# Patient Record
Sex: MALE | Race: BLACK OR AFRICAN AMERICAN | Employment: FULL TIME | URBAN - METROPOLITAN AREA
[De-identification: names, ages, dates, MRNs, and addresses within clinical notes are randomized per-mention and may not be internally consistent; named-entity substitution may affect disease eponyms.]

---

## 2017-10-17 ENCOUNTER — HOSPITAL ENCOUNTER (EMERGENCY)
Facility: CLINIC | Age: 67
Discharge: HOME | End: 2017-10-17
Attending: EMERGENCY MEDICINE
Payer: MEDICARE

## 2017-10-17 VITALS
SYSTOLIC BLOOD PRESSURE: 141 MMHG | DIASTOLIC BLOOD PRESSURE: 86 MMHG | BODY MASS INDEX: 29.82 KG/M2 | WEIGHT: 225 LBS | HEART RATE: 76 BPM | TEMPERATURE: 98.1 F | HEIGHT: 73 IN | OXYGEN SATURATION: 99 % | RESPIRATION RATE: 16 BRPM

## 2017-10-17 DIAGNOSIS — S81.812A LACERATION OF LEFT LOWER EXTREMITY, INITIAL ENCOUNTER: Primary | ICD-10-CM

## 2017-10-17 PROCEDURE — 99282 EMERGENCY DEPT VISIT SF MDM: CPT

## 2017-10-17 PROCEDURE — 2500000003 HC RX 250 WO HCPCS: Performed by: EMERGENCY MEDICINE

## 2017-10-17 PROCEDURE — 90715 TDAP VACCINE 7 YRS/> IM: CPT | Performed by: EMERGENCY MEDICINE

## 2017-10-17 PROCEDURE — 12004 RPR S/N/AX/GEN/TRK7.6-12.5CM: CPT

## 2017-10-17 PROCEDURE — 90471 IMMUNIZATION ADMIN: CPT | Performed by: EMERGENCY MEDICINE

## 2017-10-17 PROCEDURE — 6360000002 HC RX W HCPCS: Performed by: EMERGENCY MEDICINE

## 2017-10-17 PROCEDURE — 6370000000 HC RX 637 (ALT 250 FOR IP): Performed by: EMERGENCY MEDICINE

## 2017-10-17 RX ORDER — BACITRACIN, NEOMYCIN, POLYMYXIN B 400; 3.5; 5 [USP'U]/G; MG/G; [USP'U]/G
OINTMENT TOPICAL ONCE
Status: COMPLETED | OUTPATIENT
Start: 2017-10-17 | End: 2017-10-17

## 2017-10-17 RX ORDER — LIDOCAINE HYDROCHLORIDE 10 MG/ML
20 INJECTION, SOLUTION INFILTRATION; PERINEURAL ONCE
Status: COMPLETED | OUTPATIENT
Start: 2017-10-17 | End: 2017-10-17

## 2017-10-17 RX ADMIN — LIDOCAINE HYDROCHLORIDE 20 ML: 10 INJECTION, SOLUTION INFILTRATION; PERINEURAL at 10:49

## 2017-10-17 RX ADMIN — TETANUS TOXOID, REDUCED DIPHTHERIA TOXOID AND ACELLULAR PERTUSSIS VACCINE, ADSORBED 0.5 ML: 5; 2.5; 8; 8; 2.5 SUSPENSION INTRAMUSCULAR at 10:49

## 2017-10-17 RX ADMIN — NEOMYCIN AND POLYMYXIN B SULFATES AND BACITRACIN ZINC: 400; 3.5; 5 OINTMENT TOPICAL at 10:50

## 2017-10-17 ASSESSMENT — PAIN DESCRIPTION - PROGRESSION: CLINICAL_PROGRESSION: NOT CHANGED

## 2017-10-17 ASSESSMENT — PAIN SCALES - GENERAL: PAINLEVEL_OUTOF10: 7

## 2017-10-17 ASSESSMENT — PAIN DESCRIPTION - LOCATION: LOCATION: LEG

## 2017-10-17 ASSESSMENT — PAIN DESCRIPTION - PAIN TYPE: TYPE: ACUTE PAIN

## 2017-10-17 ASSESSMENT — PAIN DESCRIPTION - ONSET: ONSET: SUDDEN

## 2017-10-17 ASSESSMENT — PAIN DESCRIPTION - DESCRIPTORS: DESCRIPTORS: ACHING

## 2017-10-17 ASSESSMENT — PAIN DESCRIPTION - ORIENTATION: ORIENTATION: LEFT;UPPER

## 2017-10-17 ASSESSMENT — PAIN DESCRIPTION - FREQUENCY: FREQUENCY: CONTINUOUS

## 2017-10-17 NOTE — ED PROVIDER NOTES
[facility]  [address] [address]  Phone: [phone]        Pt Name: Pastor Rodriguez  MRN: [id number]  [place] 1950  Date of evaluation: 10/17/17      CHIEF COMPLAINT       Chief Complaint   Patient presents with    Laceration     2 laceration to the left upper leg. HISTORY OF PRESENT ILLNESS    Pastor Rodriguez is a 74 y.o. male who presents Of his left leg, it occurred just prior to arrival.  He is self-employed contractor he was using a type of saw with a carbide blade he would put it down it was still running a caught his jeans lacerating and medial aspect of his left thigh just above the knee and also Just above  the knee . He was able toambulate he went to urgent care who called the squad who sent him over here. He denies any other injuries he is not a diabetic. His last tetanus was unknown      REVIEW OF SYSTEMS       All systems reviewed and negative as stated above     PAST MEDICAL HISTORY    has a past medical history of Hypertension. SURGICAL HISTORY      has a past surgical history that includes Rotator cuff repair (Right) and Foot surgery (Right, 2014). CURRENT MEDICATIONS       Previous Medications    AMLODIPINE BESYLATE PO    Take by mouth    LISINOPRIL-HYDROCHLOROTHIAZIDE PO    Take by mouth       ALLERGIES     is allergic to vicodin [hydrocodone-acetaminophen]; diprivan [propofol]; and other. FAMILY HISTORY     has no family status information on file. family history is not on file. SOCIAL HISTORY      reports that he has never smoked. He has never used smokeless tobacco. He reports that he drinks about 1.2 oz of alcohol per week . He reports that he does not use drugs. PHYSICAL EXAM     INITIAL VITALS:  height is 6' 1\" (1.854 m) and weight is 102.1 kg (225 lb). His oral temperature is 98.1 °F (36.7 °C). His blood pressure is 125/74 and his pulse is 64. His respiration is 16 and oxygen saturation is 94%.         Physical Exam   Constitutional: He is (225 lb)   Height: 6' 1\" (1.854 m)     -------------------------  BP: 125/74, Temp: 98.1 °F (36.7 °C), Pulse: 64, Resp: 16          CONSULTS:      PROCEDURES:  Laceration repair  Indications were 6 cm laceration, and a 5 cm laceration to the left medial thigh  Prepped with Betadine the wound was then draped in a sterile fashion and irrigated with sterile saline and anesthetized locally with 1% lidocaine total of 12 mL were used between both lacerations of the lacerations were explored there was no foreign bodies no penetration to the muscular layer the upper laceration was closed with 11 4-0 Ethilon sutures with good wound approximation  The lower laceration was closed with 12 4-0 Ethilon sutures with good approximation patient tolerated the procedure well bacitracin and a dressing was applied as stated above he was updated on his tetanus    FINAL IMPRESSION      1. Laceration of left lower extremity, initial encounter          DISPOSITION/PLAN   Discharge in stable condition    PATIENT REFERRED TO:  Physician of choice            DISCHARGE MEDICATIONS:  New Prescriptions    No medications on file       (Please note that portions of this note were completed with a voice recognition program.  Efforts were made to edit the dictations but occasionally words are mis-transcribed.)    Bird MD, F.A.A.E.M.   Attending Emergency Medicine Physician      Polly Williamson MD  10/17/17 [id number]

## 2017-10-17 NOTE — ED TRIAGE NOTES
Patient picked up the circular blade saw with carbide blade that was on and he did not know it. It feel and hit him in two spots in the left upper leg.

## 2017-10-25 ENCOUNTER — HOSPITAL ENCOUNTER (EMERGENCY)
Facility: CLINIC | Age: 67
Discharge: HOME | End: 2017-10-25
Attending: SPECIALIST
Payer: MEDICARE

## 2017-10-25 VITALS
SYSTOLIC BLOOD PRESSURE: 170 MMHG | HEIGHT: 74 IN | HEART RATE: 57 BPM | RESPIRATION RATE: 16 BRPM | DIASTOLIC BLOOD PRESSURE: 93 MMHG | WEIGHT: 225 LBS | OXYGEN SATURATION: 96 % | BODY MASS INDEX: 28.88 KG/M2 | TEMPERATURE: 97.8 F

## 2017-10-25 DIAGNOSIS — I10 ESSENTIAL HYPERTENSION: ICD-10-CM

## 2017-10-25 DIAGNOSIS — Z51.89 VISIT FOR WOUND CHECK: Primary | ICD-10-CM

## 2017-10-25 PROCEDURE — 99282 EMERGENCY DEPT VISIT SF MDM: CPT

## 2017-10-25 RX ORDER — CEPHALEXIN 500 MG/1
500 CAPSULE ORAL 4 TIMES DAILY
Qty: 28 CAPSULE | Refills: 0 | Status: SHIPPED | OUTPATIENT
Start: 2017-10-25 | End: 2017-11-01

## 2017-10-25 RX ORDER — LISINOPRIL AND HYDROCHLOROTHIAZIDE 20; 12.5 MG/1; MG/1
1 TABLET ORAL DAILY
Qty: 7 TABLET | Refills: 0 | Status: SHIPPED | OUTPATIENT
Start: 2017-10-25

## 2017-10-25 ASSESSMENT — ENCOUNTER SYMPTOMS: COLOR CHANGE: 1

## 2017-10-25 NOTE — ED NOTES
Pt arrives to ER with c/o suture removal.  He states that his sutures are coming out in his left leg. 2 wounds noted to medial left thigh. Pt applied liquid band-aid. Healing noted, redness around both sites, warm to the touch. Pt resting in resting in bed, comfort offered, no concerns no s/s of distress.       Hay Mcdonough RN  10/25/17 4131

## 2017-10-25 NOTE — ED PROVIDER NOTES
display      none obtained      LABS:  Labs Reviewed - No data to display      EMERGENCY DEPARTMENT COURSE:   Vitals:    Vitals:    10/25/17 1554 10/25/17 1632   BP: (!) 192/101 (!) 170/93   Pulse: 57    Resp: 16    Temp: 97.8 °F (36.6 °C)    TempSrc: Temporal    SpO2: 96%    Weight: 225 lb (102.1 kg)    Height: 6' 2\" (1.88 m)      -------------------------  BP: (!) 170/93, Temp: 97.8 °F (36.6 °C), Pulse: 57, Resp: 16    Orders Placed This Encounter   Medications    lisinopril-hydrochlorothiazide (PRINZIDE;ZESTORETIC) 20-12.5 MG per tablet     Sig: Take 1 tablet by mouth daily     Dispense:  7 tablet     Refill:  0    cephALEXin (KEFLEX) 500 MG capsule     Sig: Take 1 capsule by mouth 4 times daily for 7 days     Dispense:  28 capsule     Refill:  0       Plan is to start the patient on Keflex 500 mg 4 times daily for 7 days. Patient has ran out of his antihypertensive medication and given prescription refill for 7 tablets. He has appointment with his PCP next week. He was advised to watch carefully for worsening of the infection at the laceration site, plenty of oral fluids, take Tylenol and/or ibuprofen as needed, follow up with PCP, return if worse. I have reviewed the disposition diagnosis with the patient and or their family/guardian. I have answered their questions and given discharge instructions. They voiced understanding of these instructions and did not have any further questions or complaints. Re-evaluation Notes    Upon reevaluation, patient is resting comfortably and does not appear to be in any pain or distress. PROCEDURES:  None    FINAL IMPRESSION      1. Visit for wound check    2.  Essential hypertension          DISPOSITION/PLAN   DISPOSITION Decision to Discharge    Condition on Disposition    stable    PATIENT REFERRED TO:  Amadou Castro MD  [address] [id number]    In 2 days  For wound re-check, For reevaluation of current